# Patient Record
Sex: MALE | URBAN - METROPOLITAN AREA
[De-identification: names, ages, dates, MRNs, and addresses within clinical notes are randomized per-mention and may not be internally consistent; named-entity substitution may affect disease eponyms.]

---

## 2018-03-29 ENCOUNTER — APPOINTMENT (RX ONLY)
Dept: URBAN - METROPOLITAN AREA CLINIC 154 | Facility: CLINIC | Age: 63
Setting detail: DERMATOLOGY
End: 2018-03-29

## 2018-03-29 DIAGNOSIS — Z48.817 ENCOUNTER FOR SURGICAL AFTERCARE FOLLOWING SURGERY ON THE SKIN AND SUBCUTANEOUS TISSUE: ICD-10-CM

## 2018-03-29 PROCEDURE — ? POST-OP WOUND EVALUATION

## 2018-03-29 PROCEDURE — 99212 OFFICE O/P EST SF 10 MIN: CPT

## 2018-03-29 ASSESSMENT — LOCATION SIMPLE DESCRIPTION DERM: LOCATION SIMPLE: LEFT EAR

## 2018-03-29 ASSESSMENT — LOCATION DETAILED DESCRIPTION DERM: LOCATION DETAILED: LEFT TRAGUS

## 2018-03-29 ASSESSMENT — LOCATION ZONE DERM: LOCATION ZONE: EAR

## 2018-03-29 NOTE — PROCEDURE: POST-OP WOUND EVALUATION
Body Location Override (Optional): left tragus
Wound Diameter In Cm(Optional): 0
Detail Level: Detailed

## 2018-04-27 ENCOUNTER — APPOINTMENT (RX ONLY)
Dept: URBAN - METROPOLITAN AREA CLINIC 154 | Facility: CLINIC | Age: 63
Setting detail: DERMATOLOGY
End: 2018-04-27

## 2018-04-27 DIAGNOSIS — Z48.817 ENCOUNTER FOR SURGICAL AFTERCARE FOLLOWING SURGERY ON THE SKIN AND SUBCUTANEOUS TISSUE: ICD-10-CM

## 2018-04-27 PROBLEM — Z85.828 PERSONAL HISTORY OF OTHER MALIGNANT NEOPLASM OF SKIN: Status: ACTIVE | Noted: 2018-04-27

## 2018-04-27 PROBLEM — J44.9 CHRONIC OBSTRUCTIVE PULMONARY DISEASE, UNSPECIFIED: Status: ACTIVE | Noted: 2018-04-27

## 2018-04-27 PROBLEM — L57.0 ACTINIC KERATOSIS: Status: ACTIVE | Noted: 2018-04-27

## 2018-04-27 PROBLEM — Z85.820 PERSONAL HISTORY OF MALIGNANT MELANOMA OF SKIN: Status: ACTIVE | Noted: 2018-04-27

## 2018-04-27 PROCEDURE — 99024 POSTOP FOLLOW-UP VISIT: CPT

## 2018-04-27 PROCEDURE — ? POST-OP WOUND CHECK

## 2018-04-27 ASSESSMENT — LOCATION ZONE DERM: LOCATION ZONE: EAR

## 2018-04-27 ASSESSMENT — LOCATION DETAILED DESCRIPTION DERM: LOCATION DETAILED: LEFT TRAGUS

## 2018-04-27 ASSESSMENT — LOCATION SIMPLE DESCRIPTION DERM: LOCATION SIMPLE: LEFT EAR

## 2018-04-27 NOTE — PROCEDURE: POST-OP WOUND CHECK
Additional Comments: Wound healed. No photo taken as patient was in a hurry
Wound Evaluated By: Renee
Body Location Override (Optional - Billing Will Still Be Based On Selected Body Map Location If Applicable): left tragus
Detail Level: Simple
Add 57937 Cpt? (Important Note: In 2017 The Use Of 21204 Is Being Tracked By Cms To Determine Future Global Period Reimbursement For Global Periods): yes

## 2018-04-27 NOTE — HPI: WOUND CHECK (POST-OP)
Date Of Procedure: 11/17/2017 repair performed by Dr Roman
Additional History: . No sign or symptom of infection. Patient states he’s doing well

## 2018-05-21 ENCOUNTER — APPOINTMENT (RX ONLY)
Dept: URBAN - METROPOLITAN AREA CLINIC 154 | Facility: CLINIC | Age: 63
Setting detail: DERMATOLOGY
End: 2018-05-21

## 2018-05-21 DIAGNOSIS — T81.89 OTHER COMPLICATIONS OF PROCEDURES, NOT ELSEWHERE CLASSIFIED: ICD-10-CM

## 2018-05-21 PROBLEM — T81.89XA OTHER COMPLICATIONS OF PROCEDURES, NOT ELSEWHERE CLASSIFIED, INITIAL ENCOUNTER: Status: ACTIVE | Noted: 2018-05-21

## 2018-05-21 PROCEDURE — 99212 OFFICE O/P EST SF 10 MIN: CPT

## 2018-05-21 PROCEDURE — ? COUNSELING

## 2018-05-21 PROCEDURE — ? ADDITIONAL NOTES

## 2018-05-21 ASSESSMENT — LOCATION ZONE DERM: LOCATION ZONE: EAR

## 2018-05-21 ASSESSMENT — LOCATION DETAILED DESCRIPTION DERM: LOCATION DETAILED: LEFT TRAGUS

## 2018-05-21 ASSESSMENT — LOCATION SIMPLE DESCRIPTION DERM: LOCATION SIMPLE: LEFT EAR

## 2018-05-21 NOTE — HPI: FOLLOW-UP
What Is The Condition That You Are Returning For Follow-Up?: squamous cell carcinoma
When Was Your Last Appointment?: 04/27/2018
During The Previous Visit, The Patient ....: Was evaluated by Nata Boyd which at the time no infection was present.
Additional History: Patient had mohs surgery 11/2017 to left tragus for treatment of squamous cell carcinoma, patient comes in today complaining of persistent drainage and tenderness of the tragus. Patient reports primary did culture on area today and prescribed topical antibiotic cream. Patient reports area has never fully healed since surgery and patient doesn’t not want to deal with this drainage any longer.

## 2018-05-21 NOTE — PROCEDURE: ADDITIONAL NOTES
Additional Notes: Lesions cleaned with alcohol swab and contents expressed with cotton tipped applicator.  Patient advised to start on topical antibiotic cream as per PCP order, unknown name of medication. Use this topically twice daily for 2 weeks.  Patient is to await culture results from PCP of left tragus drainage, suggest treatment with po abx be completed prior to any repair procedure.  There does not appear to be any obvious signs of infection during visit today. It appears there is a pocket formation of the left tragus and a possible cyst like opening has formed filling up with skin cells and persistent drainage. Patient is requesting a definitive treatment be done to close this area up after the surgery. Will schedule patient with Dr Roman, who performed original closure, to further discuss option of possible repair.

## 2018-05-31 ENCOUNTER — APPOINTMENT (RX ONLY)
Dept: URBAN - METROPOLITAN AREA CLINIC 154 | Facility: CLINIC | Age: 63
Setting detail: DERMATOLOGY
End: 2018-05-31

## 2018-05-31 DIAGNOSIS — Z48.1 ENCOUNTER FOR PLANNED POSTPROCEDURAL WOUND CLOSURE: ICD-10-CM

## 2018-05-31 PROCEDURE — ? REPAIR NOTE

## 2018-05-31 PROCEDURE — 12011 RPR F/E/E/N/L/M 2.5 CM/<: CPT

## 2018-05-31 ASSESSMENT — LOCATION DETAILED DESCRIPTION DERM: LOCATION DETAILED: LEFT CAVUM CONCHA

## 2018-05-31 ASSESSMENT — LOCATION ZONE DERM: LOCATION ZONE: EAR

## 2018-05-31 ASSESSMENT — LOCATION SIMPLE DESCRIPTION DERM: LOCATION SIMPLE: LEFT EAR

## 2018-05-31 NOTE — PROCEDURE: REPAIR NOTE
Body Location Override (Optional - Billing Will Still Be Based On Selected Body Map Location If Applicable): left Tragus

## 2022-05-16 NOTE — PROCEDURE: REPAIR NOTE
done Advancement Flap (Single) Text: The defect edges were debeveled with a #15 scalpel blade.  Given the location of the defect and the proximity to free margins a single advancement flap was deemed most appropriate.  Using a sterile surgical marker, an appropriate advancement flap was drawn incorporating the defect and placing the expected incisions within the relaxed skin tension lines where possible.    The area thus outlined was incised deep to adipose tissue with a #15 scalpel blade.  The skin margins were undermined to an appropriate distance in all directions utilizing iris scissors.